# Patient Record
Sex: MALE | RURAL
[De-identification: names, ages, dates, MRNs, and addresses within clinical notes are randomized per-mention and may not be internally consistent; named-entity substitution may affect disease eponyms.]

---

## 2023-08-23 ENCOUNTER — TELEPHONE (OUTPATIENT)
Age: 53
End: 2023-08-23

## 2023-08-23 NOTE — TELEPHONE ENCOUNTER
See message below from [facility]. Is there anyone willing to see patient as a new patient before October 13?

## 2023-08-23 NOTE — TELEPHONE ENCOUNTER
----- Message from Basilio Cary sent at 8/23/2023  3:54 PM EDT -----  Subject: Message to Provider    QUESTIONS  Information for Provider? pt has a new pt ulysses in Oct but needs to see   about upcoming ulysses or cancellation as pt was admitted to [facility] for   [facility]. pt   needs to est a new pcp and a follow up as this was a Stroke diagnosis   hospital discharged follow up   ---------------------------------------------------------------------------  --------------  [address]  [phone]; OK to leave message on voicemail  ---------------------------------------------------------------------------  --------------  SCRIPT ANSWERS  Relationship to Patient?  Self

## 2023-09-18 ENCOUNTER — OFFICE VISIT (OUTPATIENT)
Age: 53
End: 2023-09-18
Payer: COMMERCIAL

## 2023-09-18 VITALS
DIASTOLIC BLOOD PRESSURE: 88 MMHG | SYSTOLIC BLOOD PRESSURE: 138 MMHG | HEIGHT: 69 IN | OXYGEN SATURATION: 98 % | WEIGHT: 260 LBS | HEART RATE: 86 BPM | BODY MASS INDEX: 38.51 KG/M2

## 2023-09-18 DIAGNOSIS — I63.9 CEREBROVASCULAR ACCIDENT (CVA), UNSPECIFIED MECHANISM (HCC): Primary | ICD-10-CM

## 2023-09-18 DIAGNOSIS — Q21.12 PFO (PATENT FORAMEN OVALE): ICD-10-CM

## 2023-09-18 DIAGNOSIS — I10 PRIMARY HYPERTENSION: ICD-10-CM

## 2023-09-18 DIAGNOSIS — E66.01 MORBID OBESITY (HCC): ICD-10-CM

## 2023-09-18 PROCEDURE — 99204 OFFICE O/P NEW MOD 45 MIN: CPT | Performed by: SPECIALIST

## 2023-09-18 PROCEDURE — 93005 ELECTROCARDIOGRAM TRACING: CPT | Performed by: SPECIALIST

## 2023-09-18 RX ORDER — ATORVASTATIN CALCIUM 80 MG/1
80 TABLET, FILM COATED ORAL DAILY
COMMUNITY
Start: 2023-08-22

## 2023-09-18 RX ORDER — LORATADINE 10 MG/1
10 TABLET ORAL DAILY PRN
COMMUNITY

## 2023-09-18 RX ORDER — ASPIRIN 81 MG/1
81 TABLET, CHEWABLE ORAL DAILY
Qty: 30 TABLET | Refills: 11 | COMMUNITY
Start: 2023-08-11 | End: 2024-08-10

## 2023-09-18 RX ORDER — LISINOPRIL 20 MG/1
20 TABLET ORAL DAILY
COMMUNITY
Start: 2023-08-22

## 2023-09-18 RX ORDER — CLOPIDOGREL BISULFATE 75 MG/1
75 TABLET ORAL DAILY
Qty: 30 TABLET | Refills: 11 | COMMUNITY
Start: 2023-08-11 | End: 2024-08-10

## 2023-09-18 ASSESSMENT — PATIENT HEALTH QUESTIONNAIRE - PHQ9
SUM OF ALL RESPONSES TO PHQ9 QUESTIONS 1 & 2: 0
SUM OF ALL RESPONSES TO PHQ QUESTIONS 1-9: 0
SUM OF ALL RESPONSES TO PHQ QUESTIONS 1-9: 0
2. FEELING DOWN, DEPRESSED OR HOPELESS: 0
1. LITTLE INTEREST OR PLEASURE IN DOING THINGS: 0
SUM OF ALL RESPONSES TO PHQ QUESTIONS 1-9: 0
SUM OF ALL RESPONSES TO PHQ QUESTIONS 1-9: 0

## 2023-09-18 NOTE — PROGRESS NOTES
Chief Complaint   Patient presents with    New Patient     Stroke Aug 1 2023. [facility] ref pt to see a cardiologist August.       Vitals:    09/18/23 1014   BP: 138/88   Site: Left Upper Arm   Position: Sitting   Pulse: 86   SpO2: 98%   Weight: 260 lb (117.9 kg)   Height: 5' 9\" (1.753 m)         Chest pain denied     SOB denied     Palpitations denied     Swelling in hands/feet denied     Dizziness denied     Recent hospital stays - [facility] in august and wearing a monitor now.      Refills denied
8/8/23 ([facility]) - LVEF 60-65%, no regional wall motion abnormalities, right to left interatrial shunt on bubble study - \"small to moderate bubbles\"  - Will refer to Dr. Eldon Medina to look into whether he needs PFO closure. Event monitor results pending. Patient was a smoker (quitting after CVA). 3) Palpitations  - Cardiac event monitor results pending    4) Dyslipidemia   - Cont Atorvastatin 80mg  - PCP to check lipids    5) HTN   - BP today 138/88 --> BP log given, patient to call if BP consistently >130/>80  - cont lisinopril     6) Prior smoker  - quit smoking after his stroke     7) See me in 3 months     Patient used to work as a  prior to the stroke. I see his brother. Single with a son. Sangeeta Wall [place], [address] [facility], [address]  [address], [address] [place] [address]  Ph: [phone]   Ph [phone]

## 2024-01-02 ENCOUNTER — TELEPHONE (OUTPATIENT)
Age: 54
End: 2024-01-02

## 2024-01-02 NOTE — TELEPHONE ENCOUNTER
Attempted to reach patient to reschedule appointment with Dr Salmeron on 01/19/2024. Provider will not be in the office. Patient's voicemail box was full. Sent letter.     [facility]

## 2024-02-07 ENCOUNTER — OFFICE VISIT (OUTPATIENT)
Age: 54
End: 2024-02-07
Payer: COMMERCIAL

## 2024-02-07 ENCOUNTER — TELEPHONE (OUTPATIENT)
Age: 54
End: 2024-02-07

## 2024-02-07 VITALS
HEIGHT: 69 IN | BODY MASS INDEX: 40.49 KG/M2 | WEIGHT: 273.4 LBS | DIASTOLIC BLOOD PRESSURE: 84 MMHG | SYSTOLIC BLOOD PRESSURE: 128 MMHG | HEART RATE: 92 BPM | OXYGEN SATURATION: 95 %

## 2024-02-07 DIAGNOSIS — Q21.12 PFO (PATENT FORAMEN OVALE): ICD-10-CM

## 2024-02-07 DIAGNOSIS — I10 PRIMARY HYPERTENSION: Primary | ICD-10-CM

## 2024-02-07 DIAGNOSIS — I63.9 CEREBROVASCULAR ACCIDENT (CVA), UNSPECIFIED MECHANISM (HCC): ICD-10-CM

## 2024-02-07 DIAGNOSIS — E78.2 MIXED HYPERLIPIDEMIA: ICD-10-CM

## 2024-02-07 PROCEDURE — 99204 OFFICE O/P NEW MOD 45 MIN: CPT | Performed by: INTERNAL MEDICINE

## 2024-02-07 PROCEDURE — 3074F SYST BP LT 130 MM HG: CPT | Performed by: INTERNAL MEDICINE

## 2024-02-07 PROCEDURE — 3079F DIAST BP 80-89 MM HG: CPT | Performed by: INTERNAL MEDICINE

## 2024-02-07 PROCEDURE — 93000 ELECTROCARDIOGRAM COMPLETE: CPT | Performed by: INTERNAL MEDICINE

## 2024-02-07 ASSESSMENT — PATIENT HEALTH QUESTIONNAIRE - PHQ9
1. LITTLE INTEREST OR PLEASURE IN DOING THINGS: 0
SUM OF ALL RESPONSES TO PHQ QUESTIONS 1-9: 0
2. FEELING DOWN, DEPRESSED OR HOPELESS: 0
SUM OF ALL RESPONSES TO PHQ QUESTIONS 1-9: 0
SUM OF ALL RESPONSES TO PHQ9 QUESTIONS 1 & 2: 0
SUM OF ALL RESPONSES TO PHQ QUESTIONS 1-9: 0
SUM OF ALL RESPONSES TO PHQ QUESTIONS 1-9: 0

## 2024-02-07 NOTE — TELEPHONE ENCOUNTER
Spoke to patient and scheduled him for SOHAIL with Dr. Salmeron on 2/26/24 @ 12:30 pm with 11:00 am arrival. Instructions sent to patient via my chart, advised to have labs by 2/21, no medications to hold. Patient verbalized understanding and had no questions at the time of call.    Patient identification verified x2.       Patient Instructions    SOHAIL (Transesophageal Echocardiogram)  Cardioversion  Pre-procedure instructions  The night before the procedure nothing to eat or drink after midnight, you may take approved medications the morning of the procedure with a few sips of water.  Medication restrictions: No medications to hold.  Labs:  Please do by 2/21  [facility] Draw Sites:  [facility] [place]: [address] [facility] [address]  [place]: [address] [facility] [address]  [place]: [address] [facility] [address] [facility] [address]  [facility]: [address] [address] [facility] [address]  [place]: [address] [facility] [address]  [facility]: [address] [facility] [address] [facility] [address]  [place]: [address] [facility] [address]  [facility]: [address] [place] [address]    Procedure day  Have a  that will bring you and take you home  Have a responsible adult that can stay with you for 24hrs  Bring ID and insurance card  Wear comfortable clothing  Leave valuables at home, bring: dentures, hearing aids, or glasses  Bring overnight bag (just in case of an overnight stay)  Where to report  [facility]: go through main entrance and to the left is outpatient registration    Date of procedure: 2/26/24 w/ Dr. Salmeron  Arrival Time: 11:00 am    Post procedure instructions  No driving for 24 hours    Contact  [facility]                                                           [address] [place] [address]

## 2024-02-07 NOTE — PROGRESS NOTES
Called  MD Dottie Ryan NP                         [facility].  [phone]            Cardiology structural heart disease consult/Progress Note      Evens Mcmahan  53 y.o.  1970    Requesting/referring provider: Jenna Calvillo APRN - EULALIO /DR. Etienne    Reason for Consult:  shunt noted on TTE     Assessment/Plan:    Cerebellar stroke   2.  Possible PFO with R To L intratrial shunt on TTE   3.  Former Tobacco use   4. Palpitations:  cardiac event monitor   5. Dyslipidemia:  Lipids 8/2023   Trig 120  HDL 41 - was started on Lipitor 80mg on discharge.   6. Hypertension   7. Possible SWATHI     Mr. Mcmahan is seen at the request of Dr. Etienne.  He has history of what appears to be cryptogenic stroke involving the left cerebellar cortex.  Etiology is unclear but based on his testing at [facility], does not seem to have significant intracranial or extracranial carotid disease or vertebral disease.  No known history of prothrombotic condition.  No evidence of atrial fibrillation on limited 3-week monitoring.  Etiology is of stroke may be secondary to small vessel involvement in the setting of prior smoking and dyslipidemia and or PFO.  Recommend performing a SOHAIL to evaluate dynamic nature, size of PFO and and atrial septal aneurysm.  I would also reach out to his neurologist regarding what their feelings about the etiology of his recent stroke.  If there is a consensus, it may be reasons to proceed with PFO closure although more information is needed at this time.    I discussed the risks/benefits/alternatives of the procedure with the patient. Risks include (but are not limited to) bleeding, infection,stroke,heart attack, need for emergency surgery/pericardiocentesis, need for dialysis or death. The patient understands and agrees to proceed.      Investigations ordered    []    High complexity decision making was performed  []    Patient is at high-risk of decompensation with

## 2024-02-15 ENCOUNTER — NURSE ONLY (OUTPATIENT)
Age: 54
End: 2024-02-15

## 2024-02-15 ENCOUNTER — OFFICE VISIT (OUTPATIENT)
Age: 54
End: 2024-02-15
Payer: COMMERCIAL

## 2024-02-15 DIAGNOSIS — E78.5 DYSLIPIDEMIA: ICD-10-CM

## 2024-02-15 DIAGNOSIS — Q21.12 PFO (PATENT FORAMEN OVALE): ICD-10-CM

## 2024-02-15 DIAGNOSIS — Z91.89: ICD-10-CM

## 2024-02-15 DIAGNOSIS — Z12.5 SCREENING FOR PROSTATE CANCER: ICD-10-CM

## 2024-02-15 DIAGNOSIS — I10 PRIMARY HYPERTENSION: ICD-10-CM

## 2024-02-15 DIAGNOSIS — Z12.11 ENCOUNTER FOR SCREENING COLONOSCOPY: ICD-10-CM

## 2024-02-15 DIAGNOSIS — G11.9: ICD-10-CM

## 2024-02-15 DIAGNOSIS — I63.119 CEREBROVASCULAR ACCIDENT (CVA) DUE TO EMBOLISM OF VERTEBRAL ARTERY, UNSPECIFIED BLOOD VESSEL LATERALITY (HCC): Primary | ICD-10-CM

## 2024-02-15 DIAGNOSIS — G47.33 OBSTRUCTIVE SLEEP APNEA SYNDROME: ICD-10-CM

## 2024-02-15 PROCEDURE — 3080F DIAST BP >= 90 MM HG: CPT | Performed by: FAMILY MEDICINE

## 2024-02-15 PROCEDURE — 3075F SYST BP GE 130 - 139MM HG: CPT | Performed by: FAMILY MEDICINE

## 2024-02-15 PROCEDURE — 99204 OFFICE O/P NEW MOD 45 MIN: CPT | Performed by: FAMILY MEDICINE

## 2024-02-15 RX ORDER — METOPROLOL SUCCINATE 25 MG/1
25 TABLET, EXTENDED RELEASE ORAL DAILY
Qty: 30 TABLET | Refills: 3 | Status: SHIPPED | OUTPATIENT
Start: 2024-02-15

## 2024-02-15 NOTE — PROGRESS NOTES
Identified pt with two pt identifiers(name and ).    Chief Complaint   Patient presents with    Establish Care     Patient is here to [facility], no prior pcp. Patient had a stroke in 2023, patient is established with cardiology         Health Maintenance Due   Topic    Hepatitis B vaccine (1 of 3 - 3-dose series)    COVID-19 Vaccine (1)    Lipids     HIV screen     Hepatitis C screen     DTaP/Tdap/Td vaccine (1 - Tdap)    Diabetes screen     Colorectal Cancer Screen     Shingles vaccine (1 of 2)    Flu vaccine (1)       Wt Readings from Last 3 Encounters:   24 124 kg (273 lb 6.4 oz)   23 117.9 kg (260 lb)     Temp Readings from Last 3 Encounters:   No data found for Temp     BP Readings from Last 3 Encounters:   24 128/84   23 138/88     Pulse Readings from Last 3 Encounters:   24 92   23 86           Depression Screening:  :         2024    10:31 AM 2023    10:24 AM   PHQ-9 Questionaire   Little interest or pleasure in doing things 0 0   Feeling down, depressed, or hopeless 0 0   PHQ-9 Total Score 0 0        Fall Risk Assessment:  :          No data to display                 Abuse Screening:  :          No data to display                 Coordination of Care Questionnaire:  :     \"Have you been to the ER, urgent care clinic since your last visit?  Hospitalized since your last visit?\"    NO    “Have you seen or consulted any other health care providers outside of [facility] since your last visit?”    Neurology [facility] Gia rasmussen    “Have you had a colorectal cancer screening such as a colonoscopy/FIT/Cologuard?    NO

## 2024-02-16 LAB
ANION GAP SERPL CALC-SCNC: 3 MMOL/L (ref 5–15)
BASOPHILS # BLD: 0.1 K/UL (ref 0–0.1)
BASOPHILS NFR BLD: 1 % (ref 0–1)
BUN SERPL-MCNC: 17 MG/DL (ref 6–20)
BUN/CREAT SERPL: 22 (ref 12–20)
CALCIUM SERPL-MCNC: 9.4 MG/DL (ref 8.5–10.1)
CHLORIDE SERPL-SCNC: 109 MMOL/L (ref 97–108)
CO2 SERPL-SCNC: 26 MMOL/L (ref 21–32)
CREAT SERPL-MCNC: 0.79 MG/DL (ref 0.7–1.3)
DIFFERENTIAL METHOD BLD: NORMAL
EOSINOPHIL # BLD: 0.2 K/UL (ref 0–0.4)
EOSINOPHIL NFR BLD: 3 % (ref 0–7)
ERYTHROCYTE [DISTWIDTH] IN BLOOD BY AUTOMATED COUNT: 13.1 % (ref 11.5–14.5)
GLUCOSE SERPL-MCNC: 104 MG/DL (ref 65–100)
HCT VFR BLD AUTO: 46.9 % (ref 36.6–50.3)
HGB BLD-MCNC: 14.9 G/DL (ref 12.1–17)
IMM GRANULOCYTES # BLD AUTO: 0 K/UL (ref 0–0.04)
IMM GRANULOCYTES NFR BLD AUTO: 0 % (ref 0–0.5)
LYMPHOCYTES # BLD: 1.8 K/UL (ref 0.8–3.5)
LYMPHOCYTES NFR BLD: 25 % (ref 12–49)
MCH RBC QN AUTO: 29.8 PG (ref 26–34)
MCHC RBC AUTO-ENTMCNC: 31.8 G/DL (ref 30–36.5)
MCV RBC AUTO: 93.8 FL (ref 80–99)
MONOCYTES # BLD: 0.5 K/UL (ref 0–1)
MONOCYTES NFR BLD: 7 % (ref 5–13)
NEUTS SEG # BLD: 4.7 K/UL (ref 1.8–8)
NEUTS SEG NFR BLD: 64 % (ref 32–75)
NRBC # BLD: 0 K/UL (ref 0–0.01)
NRBC BLD-RTO: 0 PER 100 WBC
PLATELET # BLD AUTO: 221 K/UL (ref 150–400)
PMV BLD AUTO: 11 FL (ref 8.9–12.9)
POTASSIUM SERPL-SCNC: 4.6 MMOL/L (ref 3.5–5.1)
RBC # BLD AUTO: 5 M/UL (ref 4.1–5.7)
SODIUM SERPL-SCNC: 138 MMOL/L (ref 136–145)
WBC # BLD AUTO: 7.3 K/UL (ref 4.1–11.1)

## 2024-02-17 VITALS
HEIGHT: 69 IN | HEART RATE: 96 BPM | SYSTOLIC BLOOD PRESSURE: 142 MMHG | RESPIRATION RATE: 17 BRPM | WEIGHT: 266.4 LBS | DIASTOLIC BLOOD PRESSURE: 98 MMHG | TEMPERATURE: 98.6 F | OXYGEN SATURATION: 96 % | BODY MASS INDEX: 39.46 KG/M2

## 2024-02-17 PROBLEM — Z91.89: Status: ACTIVE | Noted: 2024-02-17

## 2024-02-17 PROBLEM — G11.9: Status: ACTIVE | Noted: 2024-02-17

## 2024-02-17 PROBLEM — E78.5 HYPERLIPIDEMIA: Status: ACTIVE | Noted: 2024-02-17

## 2024-02-17 PROBLEM — Z12.5 SCREENING FOR PROSTATE CANCER: Status: ACTIVE | Noted: 2024-02-17

## 2024-02-17 PROBLEM — G47.30 SLEEP APNEA: Status: ACTIVE | Noted: 2024-02-17

## 2024-02-17 LAB — PSA SERPL-MCNC: 0.4 NG/ML (ref 0–4)

## 2024-02-17 NOTE — PROGRESS NOTES
Eevns Mcmahan (:  1970) is a 53 y.o. male,New patient, here for evaluation of the following chief complaint(s):  [facility] (Patient is here to [facility], no prior pcp. Patient had a stroke in 2023, patient is established with cardiology. Patient takes his BP at home and its around 130/80s) and Labs Only (Patient is fasting if labs need to be completed )         ASSESSMENT/PLAN:  1. 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 At risk for unstable body temperature  3. Vestibulocerebellar ataxia (HCC)  4. PFO (patent foramen ovale)  -     metoprolol succinate (TOPROL XL) 25 MG extended release tablet; Take 1 tablet by mouth daily, Disp-30 tablet, R-3Normal  5. Primary hypertension  -     Lipid Panel; Future  6. Screening for prostate cancer  -     PSA Screening; Future  7. Encounter for screening colonoscopy  -     Hepatitis C Antibody; Future  -     HIV 1/2 Ag/Ab, 4TH Generation,W Rflx Confirm; Future  -     AFL - Kelly Singh MD, Gastroenterology, [place] ([address])  8. Obstructive sleep apnea syndrome  9. Dyslipidemia      No follow-ups on file.       Patient reports compliance on lisinopril 20 mg daily, atorvastatin 80 mg daily, asa 81 mg daily. I added metoprolol 25 extended release to help with the BP.     Patient should follow up with sleep medicine.   Subjective   SUBJECTIVE/OBJECTIVE:  Evens Mcmahan is a 53 y.o. male presents to [facility], has a recent history of CVA, HTN, and HLD. Patient complains of temperature instability of the right side of the body, balance issues, especially when he is tired and dehydrated. Patient is working on nicotine cessation.         Review of Systems   Constitutional:  Negative for activity change, appetite change, chills and fatigue.   Eyes:

## 2024-02-26 ENCOUNTER — ANESTHESIA EVENT (OUTPATIENT)
Facility: HOSPITAL | Age: 54
End: 2024-02-26
Payer: COMMERCIAL

## 2024-02-26 ENCOUNTER — HOSPITAL ENCOUNTER (OUTPATIENT)
Facility: HOSPITAL | Age: 54
Discharge: HOME | End: 2024-02-28
Attending: INTERNAL MEDICINE
Payer: COMMERCIAL

## 2024-02-26 ENCOUNTER — ANESTHESIA (OUTPATIENT)
Facility: HOSPITAL | Age: 54
End: 2024-02-26
Payer: COMMERCIAL

## 2024-02-26 ENCOUNTER — TELEPHONE (OUTPATIENT)
Age: 54
End: 2024-02-26

## 2024-02-26 VITALS
OXYGEN SATURATION: 95 % | SYSTOLIC BLOOD PRESSURE: 143 MMHG | TEMPERATURE: 98 F | WEIGHT: 265 LBS | DIASTOLIC BLOOD PRESSURE: 75 MMHG | HEART RATE: 85 BPM | HEIGHT: 69 IN | BODY MASS INDEX: 39.25 KG/M2 | RESPIRATION RATE: 8 BRPM

## 2024-02-26 DIAGNOSIS — Q21.12 PFO (PATENT FORAMEN OVALE): ICD-10-CM

## 2024-02-26 PROCEDURE — 3700000001 HC ADD 15 MINUTES (ANESTHESIA)

## 2024-02-26 PROCEDURE — 3700000000 HC ANESTHESIA ATTENDED CARE

## 2024-02-26 PROCEDURE — 6360000002 HC RX W HCPCS: Performed by: NURSE ANESTHETIST, CERTIFIED REGISTERED

## 2024-02-26 PROCEDURE — 2580000003 HC RX 258: Performed by: NURSE ANESTHETIST, CERTIFIED REGISTERED

## 2024-02-26 PROCEDURE — 93312 ECHO TRANSESOPHAGEAL: CPT

## 2024-02-26 PROCEDURE — 2500000003 HC RX 250 WO HCPCS: Performed by: NURSE ANESTHETIST, CERTIFIED REGISTERED

## 2024-02-26 RX ORDER — LIDOCAINE HYDROCHLORIDE 20 MG/ML
INJECTION, SOLUTION EPIDURAL; INFILTRATION; INTRACAUDAL; PERINEURAL PRN
Status: DISCONTINUED | OUTPATIENT
Start: 2024-02-26 | End: 2024-02-26 | Stop reason: SDUPTHER

## 2024-02-26 RX ORDER — SODIUM CHLORIDE 9 MG/ML
INJECTION, SOLUTION INTRAVENOUS CONTINUOUS PRN
Status: DISCONTINUED | OUTPATIENT
Start: 2024-02-26 | End: 2024-02-26 | Stop reason: SDUPTHER

## 2024-02-26 RX ORDER — LIDOCAINE HYDROCHLORIDE 20 MG/ML
INJECTION, SOLUTION EPIDURAL; INFILTRATION; INTRACAUDAL; PERINEURAL
Status: DISPENSED
Start: 2024-02-26 | End: 2024-02-27

## 2024-02-26 RX ADMIN — PROPOFOL 100 MG: 10 INJECTION, EMULSION INTRAVENOUS at 13:14

## 2024-02-26 RX ADMIN — LIDOCAINE HYDROCHLORIDE 100 MG: 20 INJECTION, SOLUTION EPIDURAL; INFILTRATION; INTRACAUDAL; PERINEURAL at 13:14

## 2024-02-26 RX ADMIN — PROPOFOL 50 MG: 10 INJECTION, EMULSION INTRAVENOUS at 13:18

## 2024-02-26 RX ADMIN — PROPOFOL 50 MG: 10 INJECTION, EMULSION INTRAVENOUS at 13:16

## 2024-02-26 RX ADMIN — SODIUM CHLORIDE: 9 INJECTION, SOLUTION INTRAVENOUS at 12:33

## 2024-02-26 RX ADMIN — PROPOFOL 50 MG: 10 INJECTION, EMULSION INTRAVENOUS at 13:22

## 2024-02-26 RX ADMIN — PROPOFOL 50 MG: 10 INJECTION, EMULSION INTRAVENOUS at 13:20

## 2024-02-26 NOTE — PROGRESS NOTES
Pt arrives ambulatory to noninvasive cardiology department accompanied by brother for SOHAIL procedure. All assessments completed and consent was reviewed.  Education given was regarding procedure, sedation medications to be given, potential side effects of medications to be given, post-procedure management and follow-up. Opportunity for questions was provided and all questions and concerns were addressed. Patient and patient contact verbalized understanding of education.

## 2024-02-26 NOTE — TELEPHONE ENCOUNTER
----- Message from Dottie Barahona, TONE - CNP sent at 2/26/2024  2:09 PM EST -----  Needs FU appt with Adri to discuss PFO closure.      Please call him    Star Sinclair

## 2024-02-26 NOTE — ANESTHESIA PRE PROCEDURE
10:25 AM    LABGLOM >60 02/15/2024 10:25 AM    GLUCOSE 104 02/15/2024 10:25 AM    CALCIUM 9.4 02/15/2024 10:25 AM       POC Tests: No results for input(s): \"POCGLU\", \"POCNA\", \"POCK\", \"POCCL\", \"POCBUN\", \"POCHEMO\", \"POCHCT\" in the last 72 hours.    Coags: No results found for: \"PROTIME\", \"INR\", \"APTT\"    HCG (If Applicable): No results found for: \"PREGTESTUR\", \"PREGSERUM\", \"HCG\", \"HCGQUANT\"     ABGs: No results found for: \"PHART\", \"PO2ART\", \"MTM4UQN\", \"DCE6AMU\", \"BEART\", \"B1FPOBLT\"     Type & Screen (If Applicable):  No results found for: \"LABABO\", \"LABRH\"    Drug/Infectious Status (If Applicable):  No results found for: \"HIV\", \"HEPCAB\"    COVID-19 Screening (If Applicable): No results found for: \"COVID19\"        Anesthesia Evaluation  Patient summary reviewed and Nursing notes reviewed  Airway: Mallampati: II  TM distance: >3 FB   Neck ROM: full  Mouth opening: > = 3 FB   Dental: normal exam         Pulmonary: breath sounds clear to auscultation  (+)     sleep apnea:                                  Cardiovascular:  Exercise tolerance: good (>4 METS)  (+) hypertension:, hyperlipidemia        Rhythm: regular  Rate: normal                 ROS comment: PFO     Neuro/Psych:   (+) CVA:            GI/Hepatic/Renal: Neg GI/Hepatic/Renal ROS            Endo/Other: Negative Endo/Other ROS                    Abdominal:             Vascular: negative vascular ROS.         Other Findings:             Anesthesia Plan      MAC     ASA 3       Induction: intravenous.      Anesthetic plan and risks discussed with patient.      Plan discussed with CRNA.    Attending anesthesiologist reviewed and agrees with Preprocedure content                Jeff Gorman MD   2/26/2024

## 2024-02-26 NOTE — PROGRESS NOTES
Patient has returned to baseline at arrival for procedure.    Dottie Barahona NP, indicates via [employer] that the patient is good for d/c.    Discussed AVS and discharge instructions with patient and provided a copy take home.    Pt voiced understanding and denied any questions or need for clarification.    IV D/C'd and hemostasis attained. Coban and gauze dressing applied.    Transported with:  Etelvina via W/C to vehicle driven by brother

## 2024-02-26 NOTE — ANESTHESIA POSTPROCEDURE EVALUATION
Department of Anesthesiology  Postprocedure Note    Patient: Evens Mcmahan  MRN: [id number]  YOB: 1970  Date of evaluation: 2/26/2024    Procedure Summary       Date: 02/26/24 Room / Location: [facility] NON-INVASIVE CARDIOLOGY    Anesthesia Start: 1311 Anesthesia Stop: 1328    Procedure: SOHAIL (PRN CONTRAST/BUBBLE/3D) Diagnosis: PFO (patent foramen ovale)    Scheduled Providers: Franko Salmeron MD; Jeff Gorman MD Responsible Provider: Jeff Gorman MD    Anesthesia Type: MAC ASA Status: 3            Anesthesia Type: MAC    Dionte Phase I: Dionte Score: 10    Dionte Phase II:      Anesthesia Post Evaluation    Patient location during evaluation: PACU  Patient participation: complete - patient participated  Level of consciousness: awake  Pain score: 0  Airway patency: patent  Nausea & Vomiting: no nausea  Cardiovascular status: blood pressure returned to baseline and hemodynamically stable  Respiratory status: acceptable  Hydration status: stable  Pain management: adequate and satisfactory to patient    No notable events documented.

## 2024-03-01 LAB — ECHO BSA: 2.42 M2

## 2024-03-04 ENCOUNTER — TELEPHONE (OUTPATIENT)
Age: 54
End: 2024-03-04

## 2024-03-04 NOTE — TELEPHONE ENCOUNTER
Telephone call made to patient. Two patient identifiers verified.   Appt with Dr. Salmeron booked April 14 to discuss PFO closure

## 2024-03-18 PROBLEM — Z12.5 SCREENING FOR PROSTATE CANCER: Status: RESOLVED | Noted: 2024-02-17 | Resolved: 2024-03-18

## 2024-03-26 ENCOUNTER — PATIENT MESSAGE (OUTPATIENT)
Age: 54
End: 2024-03-26

## 2024-03-27 RX ORDER — LISINOPRIL 20 MG/1
20 TABLET ORAL DAILY
Qty: 90 TABLET | Refills: 0 | Status: SHIPPED | OUTPATIENT
Start: 2024-03-27

## 2024-03-27 RX ORDER — ATORVASTATIN CALCIUM 80 MG/1
80 TABLET, FILM COATED ORAL DAILY
Qty: 90 TABLET | Refills: 0 | Status: SHIPPED | OUTPATIENT
Start: 2024-03-27

## 2024-03-27 NOTE — TELEPHONE ENCOUNTER
From: Evens Mcmahan  To: Dr. Aleshia Rowe  Sent: 3/26/2024 3:40 PM EDT  Subject: Prescription refills    Dr. Rowe, my prescriptions for Lisinopril and Atorvastatin are just about out. The pharmacy said they were going to contact my physician for me...but I think they contacted Dr. Coates, who I haven't seen since my stay at [facility]. Other than being more proactive in the future, what should I do now?

## 2024-04-17 ENCOUNTER — OFFICE VISIT (OUTPATIENT)
Age: 54
End: 2024-04-17
Payer: COMMERCIAL

## 2024-04-17 VITALS
OXYGEN SATURATION: 95 % | SYSTOLIC BLOOD PRESSURE: 124 MMHG | HEART RATE: 96 BPM | BODY MASS INDEX: 41.2 KG/M2 | DIASTOLIC BLOOD PRESSURE: 62 MMHG | WEIGHT: 278.2 LBS | HEIGHT: 69 IN

## 2024-04-17 DIAGNOSIS — E78.2 MIXED HYPERLIPIDEMIA: ICD-10-CM

## 2024-04-17 DIAGNOSIS — Z72.0 TOBACCO ABUSE: ICD-10-CM

## 2024-04-17 DIAGNOSIS — I10 PRIMARY HYPERTENSION: ICD-10-CM

## 2024-04-17 DIAGNOSIS — I63.9 CEREBROVASCULAR ACCIDENT (CVA), UNSPECIFIED MECHANISM (HCC): Primary | ICD-10-CM

## 2024-04-17 DIAGNOSIS — Q21.12 PFO (PATENT FORAMEN OVALE): ICD-10-CM

## 2024-04-17 PROCEDURE — 3078F DIAST BP <80 MM HG: CPT | Performed by: INTERNAL MEDICINE

## 2024-04-17 PROCEDURE — 3074F SYST BP LT 130 MM HG: CPT | Performed by: INTERNAL MEDICINE

## 2024-04-17 PROCEDURE — 99214 OFFICE O/P EST MOD 30 MIN: CPT | Performed by: INTERNAL MEDICINE

## 2024-04-17 ASSESSMENT — PATIENT HEALTH QUESTIONNAIRE - PHQ9
SUM OF ALL RESPONSES TO PHQ9 QUESTIONS 1 & 2: 0
SUM OF ALL RESPONSES TO PHQ QUESTIONS 1-9: 0
SUM OF ALL RESPONSES TO PHQ QUESTIONS 1-9: 0
2. FEELING DOWN, DEPRESSED OR HOPELESS: NOT AT ALL
SUM OF ALL RESPONSES TO PHQ QUESTIONS 1-9: 0
SUM OF ALL RESPONSES TO PHQ QUESTIONS 1-9: 0
1. LITTLE INTEREST OR PLEASURE IN DOING THINGS: NOT AT ALL

## 2024-04-23 PROBLEM — Z72.0 TOBACCO ABUSE: Status: ACTIVE | Noted: 2024-04-23

## 2024-04-23 NOTE — PROGRESS NOTES
Called  MD Dottie Ryan, EULALIO                         [facility] Card[facility].  [phone]            Cardiology structural heart disease consult/Progress Note      Evens Mcmahan  53 y.o.  1970    Requesting/referring provider: Aleshia Rowe MD /DR. Etienne    Reason for Consult:  shunt noted on TTE     Assessment/Plan:    Cerebellar stroke   2.  Possible PFO with R To L intratrial shunt on TTE   3.  Former Tobacco use   4. Palpitations:  cardiac event monitor   5. Dyslipidemia:  Lipids 8/2023   Trig 120  HDL 41 - was started on Lipitor 80mg on discharge.   6. Hypertension   7. Possible SWATHI     Mr. Mcmahan is seen at the request of Dr. Etienne.  He has history of what appears to be cryptogenic stroke involving the left cerebellar cortex.  Etiology is unclear but based on [facility], does not seem to have significant intracranial or extracranial carotid disease or vertebral disease.    He did undergo a transesophageal echocardiogram which demonstrates significantly aneurysmal septum with other features suggesting possible high risk anatomy/anatomical features for potential paradoxical embolism.     Nonetheless after further discussion with neurology and reviewing their recent notes, it appears that the etiology of stroke is likely secondary to small vessel involvement rather than truly an embolic phenomenon.     I laid out the options of PFO closure versus addressing other concerns such as smoking cessation, hypertension and cholesterol control with the patient.  At this time, we have elected to defer PFO closure and address this/treat this as likely small vessel stroke.  He will continue his long-term follow-up with Dr. Etienne unless he has a stroke which is clearly embolic per neurology    Investigations ordered    []    High complexity decision making was performed  []    Patient is at high-risk of decompensation with multiple organ involvement  []    Complex/difficult social

## 2024-06-25 RX ORDER — ATORVASTATIN CALCIUM 80 MG/1
80 TABLET, FILM COATED ORAL DAILY
Qty: 90 TABLET | Refills: 0 | Status: SHIPPED | OUTPATIENT
Start: 2024-06-25

## 2024-06-25 RX ORDER — LISINOPRIL 20 MG/1
20 TABLET ORAL DAILY
Qty: 90 TABLET | Refills: 0 | Status: SHIPPED | OUTPATIENT
Start: 2024-06-25

## 2024-07-12 DIAGNOSIS — I63.119 CEREBROVASCULAR ACCIDENT (CVA) DUE TO EMBOLISM OF VERTEBRAL ARTERY, UNSPECIFIED BLOOD VESSEL LATERALITY (HCC): ICD-10-CM

## 2024-07-12 DIAGNOSIS — Q21.12 PFO (PATENT FORAMEN OVALE): ICD-10-CM

## 2024-07-12 RX ORDER — METOPROLOL SUCCINATE 25 MG/1
25 TABLET, EXTENDED RELEASE ORAL DAILY
Qty: 30 TABLET | Refills: 3 | Status: SHIPPED | OUTPATIENT
Start: 2024-07-12

## 2024-09-26 RX ORDER — LISINOPRIL 20 MG/1
20 TABLET ORAL DAILY
Qty: 90 TABLET | Refills: 0 | Status: SHIPPED | OUTPATIENT
Start: 2024-09-26

## 2024-09-26 RX ORDER — ATORVASTATIN CALCIUM 80 MG/1
80 TABLET, FILM COATED ORAL DAILY
Qty: 90 TABLET | Refills: 0 | Status: SHIPPED | OUTPATIENT
Start: 2024-09-26

## 2024-12-14 DIAGNOSIS — Q21.12 PFO (PATENT FORAMEN OVALE): ICD-10-CM

## 2024-12-14 DIAGNOSIS — I63.119 CEREBROVASCULAR ACCIDENT (CVA) DUE TO EMBOLISM OF VERTEBRAL ARTERY, UNSPECIFIED BLOOD VESSEL LATERALITY (HCC): ICD-10-CM

## 2024-12-16 RX ORDER — METOPROLOL SUCCINATE 25 MG/1
25 TABLET, EXTENDED RELEASE ORAL DAILY
Qty: 90 TABLET | Refills: 2 | Status: SHIPPED | OUTPATIENT
Start: 2024-12-16

## 2025-01-06 RX ORDER — ATORVASTATIN CALCIUM 80 MG/1
80 TABLET, FILM COATED ORAL DAILY
Qty: 90 TABLET | Refills: 0 | Status: SHIPPED | OUTPATIENT
Start: 2025-01-06

## 2025-01-06 RX ORDER — LISINOPRIL 20 MG/1
20 TABLET ORAL DAILY
Qty: 90 TABLET | Refills: 0 | Status: SHIPPED | OUTPATIENT
Start: 2025-01-06

## 2025-01-27 ENCOUNTER — COMMUNITY OUTREACH (OUTPATIENT)
Age: 55
End: 2025-01-27

## 2025-04-21 RX ORDER — LISINOPRIL 20 MG/1
20 TABLET ORAL DAILY
Qty: 90 TABLET | Refills: 0 | Status: SHIPPED | OUTPATIENT
Start: 2025-04-21

## 2025-04-21 RX ORDER — ATORVASTATIN CALCIUM 80 MG/1
80 TABLET, FILM COATED ORAL DAILY
Qty: 90 TABLET | Refills: 0 | Status: SHIPPED | OUTPATIENT
Start: 2025-04-21

## 2025-04-23 ENCOUNTER — OFFICE VISIT (OUTPATIENT)
Age: 55
End: 2025-04-23
Payer: COMMERCIAL

## 2025-04-23 VITALS
DIASTOLIC BLOOD PRESSURE: 110 MMHG | WEIGHT: 264 LBS | SYSTOLIC BLOOD PRESSURE: 150 MMHG | RESPIRATION RATE: 16 BRPM | OXYGEN SATURATION: 92 % | BODY MASS INDEX: 39.1 KG/M2 | HEIGHT: 69 IN | HEART RATE: 84 BPM

## 2025-04-23 DIAGNOSIS — Q21.12 PFO (PATENT FORAMEN OVALE): ICD-10-CM

## 2025-04-23 DIAGNOSIS — I10 PRIMARY HYPERTENSION: ICD-10-CM

## 2025-04-23 DIAGNOSIS — Q21.12 PFO (PATENT FORAMEN OVALE): Primary | ICD-10-CM

## 2025-04-23 PROCEDURE — 3080F DIAST BP >= 90 MM HG: CPT | Performed by: INTERNAL MEDICINE

## 2025-04-23 PROCEDURE — 3077F SYST BP >= 140 MM HG: CPT | Performed by: INTERNAL MEDICINE

## 2025-04-23 PROCEDURE — 99214 OFFICE O/P EST MOD 30 MIN: CPT | Performed by: INTERNAL MEDICINE

## 2025-04-23 PROCEDURE — 93000 ELECTROCARDIOGRAM COMPLETE: CPT | Performed by: INTERNAL MEDICINE

## 2025-04-23 NOTE — PATIENT INSTRUCTIONS
Take your blood pressure randomly once daily for 2 weeks and send us a message through [employer] with your blood pressure readings    We would like your LDL (\"bad cholesterol) to be <70; have FASTING blood work drawn    Continue all medications!

## 2025-04-23 NOTE — PROGRESS NOTES
Called  MD Dottie Ryan NP                         [facility].  [phone]            Cardiology structural heart disease consult/Progress Note      Evens Mcmahan  54 y.o.  1970    Requesting/referring provider: Aleshia Rowe MD    Reason for Consult:  shunt noted on TTE     Assessment/Plan:    Cerebellar stroke   2.  Possible PFO with R To L intratrial shunt on TTE   3.  Former Tobacco use   4. Palpitations:  cardiac event monitor   5. Dyslipidemia:  Lipids 8/2023   Trig 120  HDL 41 - was started on Lipitor 80mg on discharge.   6. Hypertension   7. Possible SWATHI     Mr. Mcmahan  has history of what appears to be cryptogenic stroke involving the left cerebellar cortex.  Etiology is unclear but based on his testing at [facility], does not seem to have significant intracranial or extracranial carotid disease or vertebral disease.      Nonetheless after further discussion with neurology and reviewing their recent notes, it appears that the etiology of stroke is likely secondary to small vessel involvement rather than truly an embolic phenomenon.  He did have a transesophageal echocardiogram which did not demonstrate any atrial septal aneurysm.  PFO was confirmed and shunt was bidirectional and moderate but not large.At this time, we have elected to defer PFO closure and address this/treat this as likely small vessel stroke.  However in future if he does have another stroke despite on adequate risk reduction therapy, we may consider PFO closure.    Instead of following up with Dr. Etienne, he has continued his follow-up with me.  I believe in terms of future stroke risk reduction, it is imperative that he stop smoking and get his LDL down to below 70 mg/dL.  He has not had his labs done for the last 1 year.  Will give him a lab slip.  He is currently on high intensity statin therapy and will review need to add ezetimibe.    His blood pressure was significantly elevated on

## 2025-05-22 ENCOUNTER — TELEPHONE (OUTPATIENT)
Age: 55
End: 2025-05-22

## 2025-08-14 ENCOUNTER — OFFICE VISIT (OUTPATIENT)
Age: 55
End: 2025-08-14
Payer: COMMERCIAL

## 2025-08-14 ENCOUNTER — LAB (OUTPATIENT)
Age: 55
End: 2025-08-14

## 2025-08-14 VITALS
HEART RATE: 80 BPM | SYSTOLIC BLOOD PRESSURE: 130 MMHG | TEMPERATURE: 98.2 F | RESPIRATION RATE: 16 BRPM | OXYGEN SATURATION: 96 % | DIASTOLIC BLOOD PRESSURE: 75 MMHG | BODY MASS INDEX: 39.55 KG/M2 | WEIGHT: 267 LBS | HEIGHT: 69 IN

## 2025-08-14 DIAGNOSIS — E78.2 MIXED HYPERLIPIDEMIA: ICD-10-CM

## 2025-08-14 DIAGNOSIS — Z12.5 SCREENING FOR PROSTATE CANCER: ICD-10-CM

## 2025-08-14 DIAGNOSIS — I63.119 CEREBROVASCULAR ACCIDENT (CVA) DUE TO EMBOLISM OF VERTEBRAL ARTERY, UNSPECIFIED BLOOD VESSEL LATERALITY (HCC): ICD-10-CM

## 2025-08-14 DIAGNOSIS — I10 PRIMARY HYPERTENSION: ICD-10-CM

## 2025-08-14 DIAGNOSIS — E66.09 OBESITY DUE TO EXCESS CALORIES WITHOUT SERIOUS COMORBIDITY, UNSPECIFIED CLASS: ICD-10-CM

## 2025-08-14 DIAGNOSIS — G47.33 OBSTRUCTIVE SLEEP APNEA SYNDROME: ICD-10-CM

## 2025-08-14 DIAGNOSIS — Z13.1 ENCOUNTER FOR SCREENING EXAMINATION FOR IMPAIRED GLUCOSE REGULATION AND DIABETES MELLITUS: ICD-10-CM

## 2025-08-14 DIAGNOSIS — G11.9: ICD-10-CM

## 2025-08-14 DIAGNOSIS — H04.123 DRY EYES: ICD-10-CM

## 2025-08-14 DIAGNOSIS — Z00.01 ENCOUNTER FOR WELL ADULT EXAM WITH ABNORMAL FINDINGS: Primary | ICD-10-CM

## 2025-08-14 DIAGNOSIS — Q21.12 PFO (PATENT FORAMEN OVALE): ICD-10-CM

## 2025-08-14 PROBLEM — N18.4 CHRONIC KIDNEY DISEASE, STAGE 4 (SEVERE) (HCC): Status: ACTIVE | Noted: 2025-08-14

## 2025-08-14 PROCEDURE — 99396 PREV VISIT EST AGE 40-64: CPT | Performed by: FAMILY MEDICINE

## 2025-08-14 PROCEDURE — 3075F SYST BP GE 130 - 139MM HG: CPT | Performed by: FAMILY MEDICINE

## 2025-08-14 PROCEDURE — 3078F DIAST BP <80 MM HG: CPT | Performed by: FAMILY MEDICINE

## 2025-08-14 RX ORDER — ASPIRIN 81 MG/1
81 TABLET, CHEWABLE ORAL DAILY
Qty: 90 TABLET | Refills: 3 | Status: SHIPPED | OUTPATIENT
Start: 2025-08-14

## 2025-08-14 RX ORDER — METOPROLOL SUCCINATE 25 MG/1
25 TABLET, EXTENDED RELEASE ORAL DAILY
Qty: 90 TABLET | Refills: 3 | Status: SHIPPED | OUTPATIENT
Start: 2025-08-14

## 2025-08-14 RX ORDER — ATORVASTATIN CALCIUM 80 MG/1
80 TABLET, FILM COATED ORAL DAILY
Qty: 90 TABLET | Refills: 3 | Status: SHIPPED | OUTPATIENT
Start: 2025-08-14

## 2025-08-14 RX ORDER — POLYETHYLENE GLYCOL 400 AND PROPYLENE GLYCOL 4; 3 MG/ML; MG/ML
1 GEL OPHTHALMIC 3 TIMES DAILY
Qty: 10 ML | Refills: 0 | Status: SHIPPED | OUTPATIENT
Start: 2025-08-14

## 2025-08-14 RX ORDER — LISINOPRIL 20 MG/1
20 TABLET ORAL DAILY
Qty: 90 TABLET | Refills: 3 | Status: SHIPPED | OUTPATIENT
Start: 2025-08-14

## 2025-08-14 SDOH — ECONOMIC STABILITY: INCOME INSECURITY: IN THE LAST 12 MONTHS, WAS THERE A TIME WHEN YOU WERE NOT ABLE TO PAY THE MORTGAGE OR RENT ON TIME?: PATIENT DECLINED

## 2025-08-14 SDOH — ECONOMIC STABILITY: TRANSPORTATION INSECURITY
IN THE PAST 12 MONTHS, HAS LACK OF TRANSPORTATION KEPT YOU FROM MEETINGS, WORK, OR FROM GETTING THINGS NEEDED FOR DAILY LIVING?: PATIENT DECLINED

## 2025-08-14 SDOH — ECONOMIC STABILITY: TRANSPORTATION INSECURITY
IN THE PAST 12 MONTHS, HAS THE LACK OF TRANSPORTATION KEPT YOU FROM MEDICAL APPOINTMENTS OR FROM GETTING MEDICATIONS?: PATIENT DECLINED

## 2025-08-14 SDOH — ECONOMIC STABILITY: FOOD INSECURITY: WITHIN THE PAST 12 MONTHS, YOU WORRIED THAT YOUR FOOD WOULD RUN OUT BEFORE YOU GOT MONEY TO BUY MORE.: PATIENT DECLINED

## 2025-08-14 SDOH — ECONOMIC STABILITY: FOOD INSECURITY: WITHIN THE PAST 12 MONTHS, THE FOOD YOU BOUGHT JUST DIDN'T LAST AND YOU DIDN'T HAVE MONEY TO GET MORE.: PATIENT DECLINED

## 2025-08-14 ASSESSMENT — PATIENT HEALTH QUESTIONNAIRE - PHQ9
7. TROUBLE CONCENTRATING ON THINGS, SUCH AS READING THE NEWSPAPER OR WATCHING TELEVISION: NOT AT ALL
8. MOVING OR SPEAKING SO SLOWLY THAT OTHER PEOPLE COULD HAVE NOTICED. OR THE OPPOSITE - BEING SO FIDGETY OR RESTLESS THAT YOU HAVE BEEN MOVING AROUND A LOT MORE THAN USUAL: NOT AT ALL
SUM OF ALL RESPONSES TO PHQ QUESTIONS 1-9: 0
4. FEELING TIRED OR HAVING LITTLE ENERGY: NOT AT ALL
3. TROUBLE FALLING OR STAYING ASLEEP: NOT AT ALL
10. IF YOU CHECKED OFF ANY PROBLEMS, HOW DIFFICULT HAVE THESE PROBLEMS MADE IT FOR YOU TO DO YOUR WORK, TAKE CARE OF THINGS AT HOME, OR GET ALONG WITH OTHER PEOPLE: NOT DIFFICULT AT ALL
5. POOR APPETITE OR OVEREATING: NOT AT ALL
SUM OF ALL RESPONSES TO PHQ QUESTIONS 1-9: 0
9. THOUGHTS THAT YOU WOULD BE BETTER OFF DEAD, OR OF HURTING YOURSELF: NOT AT ALL
1. LITTLE INTEREST OR PLEASURE IN DOING THINGS: NOT AT ALL
2. FEELING DOWN, DEPRESSED OR HOPELESS: NOT AT ALL
SUM OF ALL RESPONSES TO PHQ QUESTIONS 1-9: 0
7. TROUBLE CONCENTRATING ON THINGS, SUCH AS READING THE NEWSPAPER OR WATCHING TELEVISION: NOT AT ALL
SUM OF ALL RESPONSES TO PHQ QUESTIONS 1-9: 0
9. THOUGHTS THAT YOU WOULD BE BETTER OFF DEAD, OR OF HURTING YOURSELF: NOT AT ALL
6. FEELING BAD ABOUT YOURSELF - OR THAT YOU ARE A FAILURE OR HAVE LET YOURSELF OR YOUR FAMILY DOWN: NOT AT ALL
3. TROUBLE FALLING OR STAYING ASLEEP: NOT AT ALL
1. LITTLE INTEREST OR PLEASURE IN DOING THINGS: NOT AT ALL
2. FEELING DOWN, DEPRESSED OR HOPELESS: NOT AT ALL
6. FEELING BAD ABOUT YOURSELF - OR THAT YOU ARE A FAILURE OR HAVE LET YOURSELF OR YOUR FAMILY DOWN: NOT AT ALL
10. IF YOU CHECKED OFF ANY PROBLEMS, HOW DIFFICULT HAVE THESE PROBLEMS MADE IT FOR YOU TO DO YOUR WORK, TAKE CARE OF THINGS AT HOME, OR GET ALONG WITH OTHER PEOPLE: NOT DIFFICULT AT ALL
4. FEELING TIRED OR HAVING LITTLE ENERGY: NOT AT ALL
8. MOVING OR SPEAKING SO SLOWLY THAT OTHER PEOPLE COULD HAVE NOTICED. OR THE OPPOSITE, BEING SO FIGETY OR RESTLESS THAT YOU HAVE BEEN MOVING AROUND A LOT MORE THAN USUAL: NOT AT ALL
SUM OF ALL RESPONSES TO PHQ QUESTIONS 1-9: 0
5. POOR APPETITE OR OVEREATING: NOT AT ALL

## 2025-08-15 LAB
25(OH)D3+25(OH)D2 SERPL-MCNC: 11.3 NG/ML (ref 30–100)
ALBUMIN SERPL-MCNC: 4.4 G/DL (ref 3.8–4.9)
ALP SERPL-CCNC: 87 IU/L (ref 44–121)
ALT SERPL-CCNC: 32 IU/L (ref 0–44)
AST SERPL-CCNC: 25 IU/L (ref 0–40)
BASOPHILS # BLD AUTO: 0.1 X10E3/UL (ref 0–0.2)
BASOPHILS NFR BLD AUTO: 1 %
BILIRUB SERPL-MCNC: <0.2 MG/DL (ref 0–1.2)
BUN SERPL-MCNC: 13 MG/DL (ref 6–24)
BUN/CREAT SERPL: 17 (ref 9–20)
CALCIUM SERPL-MCNC: 9 MG/DL (ref 8.7–10.2)
CHLORIDE SERPL-SCNC: 103 MMOL/L (ref 96–106)
CHOLEST SERPL-MCNC: 143 MG/DL (ref 100–199)
CO2 SERPL-SCNC: 25 MMOL/L (ref 20–29)
CREAT SERPL-MCNC: 0.78 MG/DL (ref 0.76–1.27)
EGFRCR SERPLBLD CKD-EPI 2021: 106 ML/MIN/1.73
EOSINOPHIL # BLD AUTO: 0.4 X10E3/UL (ref 0–0.4)
EOSINOPHIL NFR BLD AUTO: 4 %
ERYTHROCYTE [DISTWIDTH] IN BLOOD BY AUTOMATED COUNT: 13.1 % (ref 11.6–15.4)
EST. AVERAGE GLUCOSE BLD GHB EST-MCNC: 131 MG/DL
GLOBULIN SER CALC-MCNC: 2.3 G/DL (ref 1.5–4.5)
GLUCOSE SERPL-MCNC: 94 MG/DL (ref 70–99)
HBA1C MFR BLD: 6.2 % (ref 4.8–5.6)
HCT VFR BLD AUTO: 43 % (ref 37.5–51)
HDLC SERPL-MCNC: 29 MG/DL
HGB BLD-MCNC: 13.9 G/DL (ref 13–17.7)
IMM GRANULOCYTES # BLD AUTO: 0 X10E3/UL (ref 0–0.1)
IMM GRANULOCYTES NFR BLD AUTO: 0 %
IMP & REVIEW OF LAB RESULTS: NORMAL
LDLC SERPL CALC-MCNC: 72 MG/DL (ref 0–99)
LYMPHOCYTES # BLD AUTO: 2.5 X10E3/UL (ref 0.7–3.1)
LYMPHOCYTES NFR BLD AUTO: 29 %
MCH RBC QN AUTO: 30.3 PG (ref 26.6–33)
MCHC RBC AUTO-ENTMCNC: 32.3 G/DL (ref 31.5–35.7)
MCV RBC AUTO: 94 FL (ref 79–97)
MONOCYTES # BLD AUTO: 0.7 X10E3/UL (ref 0.1–0.9)
MONOCYTES NFR BLD AUTO: 8 %
NEUTROPHILS # BLD AUTO: 5.2 X10E3/UL (ref 1.4–7)
NEUTROPHILS NFR BLD AUTO: 57 %
PLATELET # BLD AUTO: 177 X10E3/UL (ref 150–450)
POTASSIUM SERPL-SCNC: 4.6 MMOL/L (ref 3.5–5.2)
PROT SERPL-MCNC: 6.7 G/DL (ref 6–8.5)
RBC # BLD AUTO: 4.59 X10E6/UL (ref 4.14–5.8)
SODIUM SERPL-SCNC: 142 MMOL/L (ref 134–144)
T4 FREE SERPL-MCNC: 1.35 NG/DL (ref 0.82–1.77)
TRIGL SERPL-MCNC: 259 MG/DL (ref 0–149)
TSH SERPL DL<=0.005 MIU/L-ACNC: 1.63 UIU/ML (ref 0.45–4.5)
VLDLC SERPL CALC-MCNC: 42 MG/DL (ref 5–40)
WBC # BLD AUTO: 8.8 X10E3/UL (ref 3.4–10.8)

## 2025-08-15 ASSESSMENT — ENCOUNTER SYMPTOMS
CONSTIPATION: 0
VOMITING: 0
BLOOD IN STOOL: 0
EYE DISCHARGE: 0
NAUSEA: 0
SINUS PRESSURE: 0
ABDOMINAL PAIN: 0
COUGH: 0
SHORTNESS OF BREATH: 0
SORE THROAT: 0
DIARRHEA: 0
EYE PAIN: 0
COLOR CHANGE: 0
EYE REDNESS: 0
WHEEZING: 0
EYE ITCHING: 0
CHEST TIGHTNESS: 0
SINUS PAIN: 0
RHINORRHEA: 0